# Patient Record
Sex: MALE
[De-identification: names, ages, dates, MRNs, and addresses within clinical notes are randomized per-mention and may not be internally consistent; named-entity substitution may affect disease eponyms.]

---

## 2019-02-26 ENCOUNTER — HOSPITAL ENCOUNTER (INPATIENT)
Dept: HOSPITAL 12 - ER | Age: 34
LOS: 2 days | Discharge: HOME | DRG: 864 | End: 2019-02-28
Admitting: INTERNAL MEDICINE
Payer: COMMERCIAL

## 2019-02-26 VITALS — HEIGHT: 70 IN | BODY MASS INDEX: 29.49 KG/M2 | WEIGHT: 206 LBS

## 2019-02-26 DIAGNOSIS — M47.812: ICD-10-CM

## 2019-02-26 DIAGNOSIS — M62.838: ICD-10-CM

## 2019-02-26 DIAGNOSIS — M48.02: ICD-10-CM

## 2019-02-26 DIAGNOSIS — Z20.828: ICD-10-CM

## 2019-02-26 DIAGNOSIS — G43.909: ICD-10-CM

## 2019-02-26 DIAGNOSIS — R50.9: Primary | ICD-10-CM

## 2019-02-26 DIAGNOSIS — I10: ICD-10-CM

## 2019-02-26 DIAGNOSIS — E66.9: ICD-10-CM

## 2019-02-26 LAB
ALP SERPL-CCNC: 74 U/L (ref 50–136)
ALT SERPL W/O P-5'-P-CCNC: 45 U/L (ref 16–63)
APPEARANCE UR: CLEAR
AST SERPL-CCNC: 45 U/L (ref 15–37)
BASOPHILS # BLD AUTO: 0 K/UL (ref 0–8)
BASOPHILS NFR BLD AUTO: 0.4 % (ref 0–2)
BILIRUB DIRECT SERPL-MCNC: 0.2 MG/DL (ref 0–0.2)
BILIRUB SERPL-MCNC: 0.9 MG/DL (ref 0.2–1)
BILIRUB UR QL STRIP: NEGATIVE
BUN SERPL-MCNC: 13 MG/DL (ref 7–18)
CHLORIDE SERPL-SCNC: 98 MMOL/L (ref 98–107)
CO2 SERPL-SCNC: 27 MMOL/L (ref 21–32)
COLOR UR: YELLOW
CREAT SERPL-MCNC: 1.3 MG/DL (ref 0.6–1.3)
CSF GLUCOSE: 66 MG/DL (ref 40–70)
CSF PROTEIN: 31 MG/DL (ref 15–45)
DEPRECATED SQUAMOUS URNS QL MICRO: (no result) /HPF
EOSINOPHIL # BLD AUTO: 0 K/UL (ref 0–0.7)
EOSINOPHIL NFR BLD AUTO: 0 % (ref 0–7)
GLUCOSE SERPL-MCNC: 140 MG/DL (ref 74–106)
GLUCOSE UR STRIP-MCNC: NEGATIVE MG/DL
HCT VFR BLD AUTO: 45.7 % (ref 36.7–47.1)
HGB BLD-MCNC: 15.6 G/DL (ref 12.5–16.3)
HGB UR QL STRIP: (no result)
KETONES UR STRIP-MCNC: NEGATIVE MG/DL
LEUKOCYTE ESTERASE UR QL STRIP: NEGATIVE
LYMPHOCYTES # BLD AUTO: 0.9 K/UL (ref 20–40)
LYMPHOCYTES NFR BLD AUTO: 14.7 % (ref 20.5–51.5)
MCH RBC QN AUTO: 27.2 UUG (ref 23.8–33.4)
MCHC RBC AUTO-ENTMCNC: 34 G/DL (ref 32.5–36.3)
MCV RBC AUTO: 79.6 FL (ref 73–96.2)
MONOCYTES # BLD AUTO: 0.6 K/UL (ref 2–10)
MONOCYTES NFR BLD AUTO: 9.3 % (ref 0–11)
NEUTROPHILS # BLD AUTO: 4.6 K/UL (ref 1.8–8.9)
NEUTROPHILS NFR BLD AUTO: 75.6 % (ref 38.5–71.5)
NITRITE UR QL STRIP: NEGATIVE
PH UR STRIP: 7 [PH] (ref 5–8)
PLATELET # BLD AUTO: 168 K/UL (ref 152–348)
POTASSIUM SERPL-SCNC: 3.1 MMOL/L (ref 3.5–5.1)
RBC # BLD AUTO: 5.74 MIL/UL (ref 4.06–5.63)
SP GR UR STRIP: 1.01 (ref 1–1.03)
UROBILINOGEN UR STRIP-MCNC: 0.2 E.U./DL
WBC # BLD AUTO: 6 K/UL (ref 3.6–10.2)
WBC #/AREA URNS HPF: (no result) /HPF
WBC #/AREA URNS HPF: (no result) /HPF (ref 0–3)
WS STN SPEC: 7.7 G/DL (ref 6.4–8.2)

## 2019-02-26 PROCEDURE — A9150 MISC/EXPER NON-PRESCRIPT DRU: HCPCS

## 2019-02-26 PROCEDURE — 009U3ZX DRAINAGE OF SPINAL CANAL, PERCUTANEOUS APPROACH, DIAGNOSTIC: ICD-10-PCS | Performed by: EMERGENCY MEDICINE

## 2019-02-26 PROCEDURE — A4663 DIALYSIS BLOOD PRESSURE CUFF: HCPCS

## 2019-02-26 PROCEDURE — G0378 HOSPITAL OBSERVATION PER HR: HCPCS

## 2019-02-26 RX ADMIN — SODIUM CHLORIDE ONE ML: 0.9 INJECTION, SOLUTION INTRAVENOUS at 21:00

## 2019-02-26 NOTE — NUR
PATIENT AND FAMILY FRIEND UNABLE TO RECALL MEDICATION AT THIS TIME. FRIEND WILL 
BRING MEDICATION IN AM

## 2019-02-26 NOTE — NUR
Dr. Mccormick on panel call with Dr. Aman Eugene. Pt accepted for Tele 
admission diagnosis Fever of unknown origin.

## 2019-02-27 VITALS — SYSTOLIC BLOOD PRESSURE: 117 MMHG | DIASTOLIC BLOOD PRESSURE: 70 MMHG

## 2019-02-27 VITALS — DIASTOLIC BLOOD PRESSURE: 78 MMHG | SYSTOLIC BLOOD PRESSURE: 127 MMHG

## 2019-02-27 VITALS — SYSTOLIC BLOOD PRESSURE: 114 MMHG | DIASTOLIC BLOOD PRESSURE: 63 MMHG

## 2019-02-27 VITALS — DIASTOLIC BLOOD PRESSURE: 80 MMHG | SYSTOLIC BLOOD PRESSURE: 139 MMHG

## 2019-02-27 VITALS — DIASTOLIC BLOOD PRESSURE: 59 MMHG | SYSTOLIC BLOOD PRESSURE: 116 MMHG

## 2019-02-27 VITALS — SYSTOLIC BLOOD PRESSURE: 138 MMHG | DIASTOLIC BLOOD PRESSURE: 84 MMHG

## 2019-02-27 LAB
ALP SERPL-CCNC: 61 U/L (ref 50–136)
ALT SERPL W/O P-5'-P-CCNC: 49 U/L (ref 16–63)
AST SERPL-CCNC: 55 U/L (ref 15–37)
BASOPHILS # BLD AUTO: 0 K/UL (ref 0–8)
BASOPHILS NFR BLD AUTO: 0.3 % (ref 0–2)
BILIRUB SERPL-MCNC: 0.8 MG/DL (ref 0.2–1)
BUN SERPL-MCNC: 9 MG/DL (ref 7–18)
CHLORIDE SERPL-SCNC: 104 MMOL/L (ref 98–107)
CO2 SERPL-SCNC: 24 MMOL/L (ref 21–32)
CREAT SERPL-MCNC: 1.1 MG/DL (ref 0.6–1.3)
EOSINOPHIL # BLD AUTO: 0 K/UL (ref 0–0.7)
EOSINOPHIL NFR BLD AUTO: 0 % (ref 0–7)
GLUCOSE SERPL-MCNC: 111 MG/DL (ref 74–106)
HCT VFR BLD AUTO: 42.9 % (ref 36.7–47.1)
HGB BLD-MCNC: 14.6 G/DL (ref 12.5–16.3)
LYMPHOCYTES # BLD AUTO: 0.5 K/UL (ref 20–40)
LYMPHOCYTES NFR BLD AUTO: 10.5 % (ref 20.5–51.5)
MAGNESIUM SERPL-MCNC: 1.8 MG/DL (ref 1.8–2.4)
MCH RBC QN AUTO: 26.9 UUG (ref 23.8–33.4)
MCHC RBC AUTO-ENTMCNC: 34 G/DL (ref 32.5–36.3)
MCV RBC AUTO: 78.9 FL (ref 73–96.2)
MONOCYTES # BLD AUTO: 0.4 K/UL (ref 2–10)
MONOCYTES NFR BLD AUTO: 7.3 % (ref 0–11)
NEUTROPHILS # BLD AUTO: 4.1 K/UL (ref 1.8–8.9)
NEUTROPHILS NFR BLD AUTO: 81.9 % (ref 38.5–71.5)
PHOSPHATE SERPL-MCNC: 2.6 MG/DL (ref 2.5–4.9)
PLATELET # BLD AUTO: 144 K/UL (ref 152–348)
POTASSIUM SERPL-SCNC: 3.5 MMOL/L (ref 3.5–5.1)
RBC # BLD AUTO: 5.44 MIL/UL (ref 4.06–5.63)
WBC # BLD AUTO: 5 K/UL (ref 3.6–10.2)
WS STN SPEC: 6.7 G/DL (ref 6.4–8.2)

## 2019-02-27 RX ADMIN — ACETAMINOPHEN PRN MG: 325 TABLET ORAL at 14:07

## 2019-02-27 RX ADMIN — TAZOBACTAM SODIUM AND PIPERACILLIN SODIUM SCH MLS/HR: 375; 3 INJECTION, SOLUTION INTRAVENOUS at 09:30

## 2019-02-27 RX ADMIN — ACETAMINOPHEN PRN MG: 325 TABLET ORAL at 06:19

## 2019-02-27 RX ADMIN — SODIUM CHLORIDE PRN MLS/HR: 0.45 INJECTION, SOLUTION INTRAVENOUS at 01:47

## 2019-02-27 RX ADMIN — DEXTROSE SCH MLS/HR: 50 INJECTION, SOLUTION INTRAVENOUS at 20:15

## 2019-02-27 RX ADMIN — SODIUM CHLORIDE PRN MLS/HR: 0.45 INJECTION, SOLUTION INTRAVENOUS at 20:12

## 2019-02-27 RX ADMIN — SODIUM CHLORIDE ONE ML: 0.9 INJECTION, SOLUTION INTRAVENOUS at 00:23

## 2019-02-27 RX ADMIN — DEXTROSE SCH MLS/HR: 50 INJECTION, SOLUTION INTRAVENOUS at 09:01

## 2019-02-27 RX ADMIN — ACETAMINOPHEN PRN MG: 325 TABLET ORAL at 21:04

## 2019-02-27 RX ADMIN — PANTOPRAZOLE SODIUM SCH MG: 40 TABLET, DELAYED RELEASE ORAL at 06:18

## 2019-02-27 RX ADMIN — TAZOBACTAM SODIUM AND PIPERACILLIN SODIUM SCH MLS/HR: 375; 3 INJECTION, SOLUTION INTRAVENOUS at 17:36

## 2019-02-27 NOTE — NUR
Per pt.request NP:PARISH LOZADA was paged to talk to the pt.PARISH LOZADA call back 
pt.was updated with plan of care.Family members at bedside.

## 2019-02-27 NOTE — NUR
Patient rested well but he accidentally pulled out his IV. Inserted new IV line on left 
forearm w/ [id number]. IVF maintained. Vital signs are stable. Afebrile. Sinus rhythm on the 
monitor.

## 2019-02-27 NOTE — NUR
Clinical Pharmacy Note: Vancomycin Dosing per Pharmacy



Subjective:

Vancomycin IV to start on this 34 yo male patient for Suspected infection (as per MD 
note-Fever of unknown etiology, possibly reactive? Rule out meningitis)



Objective:

BUN 9/Scr 1.1   WBC 5.0     Temperature 99.4

ht 177.8 cm

wt 93.4 kg



Assessment/Plan:

Patient received vanco 1gm IVPB x1 in ED on 2/26 at 2130.Will start vancomycin 1500mg IVPB 
Q11hr for a predicted vancomycin steady state trough level of 15 mcg/ml. 1st dose today at 
0900. Will draw a vancomycin trough level prior to the 4th dose of vancomycin (not ordered 
yet).  Will monitor renal function and adjust vancomycin dose, if needed, should renal 
function change significantly. Will follow daily.

## 2019-02-27 NOTE — NUR
Admitted to Tele floor Dx. Fever Unknown origin. Patient was transported from ER via gurney, 
awake alert & oriented, no SOB denies chest pain.  Admission assessment initiated. Patient 
Afebrile at this time 98. 7 F Sinus rhythm on the monitor.

## 2019-02-28 VITALS — DIASTOLIC BLOOD PRESSURE: 51 MMHG | SYSTOLIC BLOOD PRESSURE: 97 MMHG

## 2019-02-28 VITALS — DIASTOLIC BLOOD PRESSURE: 81 MMHG | SYSTOLIC BLOOD PRESSURE: 124 MMHG

## 2019-02-28 VITALS — SYSTOLIC BLOOD PRESSURE: 131 MMHG | DIASTOLIC BLOOD PRESSURE: 68 MMHG

## 2019-02-28 VITALS — SYSTOLIC BLOOD PRESSURE: 123 MMHG | DIASTOLIC BLOOD PRESSURE: 72 MMHG

## 2019-02-28 LAB
ALP SERPL-CCNC: 75 U/L (ref 50–136)
ALT SERPL W/O P-5'-P-CCNC: 97 U/L (ref 16–63)
AST SERPL-CCNC: 92 U/L (ref 15–37)
BASOPHILS # BLD AUTO: 0 K/UL (ref 0–8)
BASOPHILS NFR BLD AUTO: 0.4 % (ref 0–2)
BILIRUB DIRECT SERPL-MCNC: 0.2 MG/DL (ref 0–0.2)
BILIRUB SERPL-MCNC: 0.8 MG/DL (ref 0.2–1)
BUN SERPL-MCNC: 7 MG/DL (ref 7–18)
CHLORIDE SERPL-SCNC: 102 MMOL/L (ref 98–107)
CO2 SERPL-SCNC: 28 MMOL/L (ref 21–32)
CREAT SERPL-MCNC: 1.1 MG/DL (ref 0.6–1.3)
EOSINOPHIL # BLD AUTO: 0 K/UL (ref 0–0.7)
EOSINOPHIL NFR BLD AUTO: 0.3 % (ref 0–7)
GLUCOSE SERPL-MCNC: 124 MG/DL (ref 74–106)
HCT VFR BLD AUTO: 45.4 % (ref 36.7–47.1)
HGB BLD-MCNC: 15.3 G/DL (ref 12.5–16.3)
LYMPHOCYTES # BLD AUTO: 0.9 K/UL (ref 20–40)
LYMPHOCYTES NFR BLD AUTO: 26 % (ref 20.5–51.5)
MAGNESIUM SERPL-MCNC: 1.9 MG/DL (ref 1.8–2.4)
MCH RBC QN AUTO: 26.9 UUG (ref 23.8–33.4)
MCHC RBC AUTO-ENTMCNC: 34 G/DL (ref 32.5–36.3)
MCV RBC AUTO: 79.7 FL (ref 73–96.2)
MONOCYTES # BLD AUTO: 0.3 K/UL (ref 2–10)
MONOCYTES NFR BLD AUTO: 9.3 % (ref 0–11)
NEUTROPHILS # BLD AUTO: 2.2 K/UL (ref 1.8–8.9)
NEUTROPHILS NFR BLD AUTO: 64 % (ref 38.5–71.5)
PHOSPHATE SERPL-MCNC: 3.4 MG/DL (ref 2.5–4.9)
PLATELET # BLD AUTO: 140 K/UL (ref 152–348)
POTASSIUM SERPL-SCNC: 3.7 MMOL/L (ref 3.5–5.1)
RBC # BLD AUTO: 5.69 MIL/UL (ref 4.06–5.63)
WBC # BLD AUTO: 3.4 K/UL (ref 3.6–10.2)
WS STN SPEC: 7.6 G/DL (ref 6.4–8.2)

## 2019-02-28 RX ADMIN — TAZOBACTAM SODIUM AND PIPERACILLIN SODIUM SCH MLS/HR: 375; 3 INJECTION, SOLUTION INTRAVENOUS at 09:02

## 2019-02-28 RX ADMIN — ACETAMINOPHEN PRN MG: 325 TABLET ORAL at 16:05

## 2019-02-28 RX ADMIN — DEXTROSE SCH MLS/HR: 50 INJECTION, SOLUTION INTRAVENOUS at 06:06

## 2019-02-28 RX ADMIN — TAZOBACTAM SODIUM AND PIPERACILLIN SODIUM SCH MLS/HR: 375; 3 INJECTION, SOLUTION INTRAVENOUS at 01:18

## 2019-02-28 RX ADMIN — PANTOPRAZOLE SODIUM SCH MG: 40 TABLET, DELAYED RELEASE ORAL at 06:06

## 2019-02-28 NOTE — NUR
Clinical Pharmacy Note: Vancomycin Dosing per Pharmacy



Subjective:

Vancomycin IV to start on this 32 yo male patient for Suspected infection (as per MD 
note-Fever of unknown etiology, possibly reactive? Rule out meningitis)



Objective:

BUN 7/Scr 1.1   WBC 3.4     Temperature 98

Vanco trough level: pending (due today at 1730)

ht 177.8 cm

wt 93.4 kg



Assessment/Plan:

Will continue same dose of vancomycin 1500mg IVPB Q11hr for today. 3rd dose was given today 
at 0700. Will draw a vancomycin trough level prior to the 4th dose of vancomycin (ordered 
for today at 1730). Pharmacy shall review the level & adjust the dose if needed.  Will 
follow daily.

## 2019-02-28 NOTE — NUR
Afebrile throughout shift, no acute resp distress. Vital signs WNL. Assisted w/ all needs, 
kept comfortable.

## 2019-02-28 NOTE — NUR
PATIENT RESTING COMFORTABLY IN BED.  NO SIGNS OF DISTRESS.  DR. EMERY DISCONTINUED 
ANTIBIOTIC TREATMENT, NO MENINGITIS SUSPECTED.    DISCUSSED POSSIBLE DISCHARGE.

## 2019-02-28 NOTE — NUR
Patient discharged at this time in stable condition. No signs of distress. Stable condition. 
Received Telephone order from [facility] to Discharge patient home. No new 
prescriptions written for patient. Patient will not be discharged with antibiotics. 
Discharge instructions provided. Instructed patient to follow-up with Dr. Hendricks within 1-2 
weeks of being discharged from [facility]. IV-access disconnected, ID-band taken off. Belongings 
returned to patient. Patient discharged from [facility] with wife safely.

## 2019-02-28 NOTE — NUR
patient resting comfortably in bed, no signs of distress. patient denies having any 
migraines at this time. ivf running. a/ox4. ambulatory. call light within reach of patient. 
will continue to monitor throughout shift. safety measures implemented.